# Patient Record
Sex: MALE | Employment: OTHER | URBAN - METROPOLITAN AREA
[De-identification: names, ages, dates, MRNs, and addresses within clinical notes are randomized per-mention and may not be internally consistent; named-entity substitution may affect disease eponyms.]

---

## 2019-03-04 ENCOUNTER — TRANSFERRED RECORDS (OUTPATIENT)
Dept: PHYSICAL THERAPY | Facility: CLINIC | Age: 64
End: 2019-03-04

## 2019-03-07 ENCOUNTER — THERAPY VISIT (OUTPATIENT)
Dept: PHYSICAL THERAPY | Facility: CLINIC | Age: 64
End: 2019-03-07
Payer: COMMERCIAL

## 2019-03-07 DIAGNOSIS — R20.0 NUMBNESS AND TINGLING OF BOTH FEET: ICD-10-CM

## 2019-03-07 DIAGNOSIS — M51.369 DDD (DEGENERATIVE DISC DISEASE), LUMBAR: ICD-10-CM

## 2019-03-07 DIAGNOSIS — R20.2 NUMBNESS AND TINGLING OF BOTH FEET: ICD-10-CM

## 2019-03-07 DIAGNOSIS — M54.50 MIDLINE LOW BACK PAIN WITHOUT SCIATICA: Primary | ICD-10-CM

## 2019-03-07 PROCEDURE — 97110 THERAPEUTIC EXERCISES: CPT | Mod: GP | Performed by: PHYSICAL THERAPIST

## 2019-03-07 PROCEDURE — 97161 PT EVAL LOW COMPLEX 20 MIN: CPT | Mod: GP | Performed by: PHYSICAL THERAPIST

## 2019-03-07 NOTE — LETTER
[facility]THLETIC [facility]  [address] [place] [address]  [address]  [facility] [id number] [phone]    2019    Re: Cameron Bennett   :   1955  MRN:  [id number]   REFERRING PHYSICIAN:   Bayron Silva    [facility] ATHLETIC [facility]    Date of Initial Evaluation:  3/7/19  Visits:  Rxs Used: 1  Reason for Referral:     Numbness and tingling of both feet  Midline low back pain without sciatica  DDD (degenerative disc disease), lumbar    EVALUATION SUMMARY    [facility] Initial [facility]    Subjective:    Patient is referred with c/o bilateral foot numbness and tingling and central LBP. He has been noting increasing sxs over the past 2 years and recently underwent and EMG which indicated some conduction issues related to lumbar spinal compression. Peripheral neuropathy related to diabetes has been ruled out. Sxs are worse with prolonged standing and walking. He is better when sitting or lying down. He has had PT in the past for LBP and responded well. PMH includes R TKA 4 years ago.   The history is provided by the patient.   Cameron Bennett is a 64 year old male with a lumbar condition.  Condition occurred with:  Insidious onset.  Condition occurred: for unknown reasons.  This is a chronic condition     Patient reports pain:  Central lumbar spine.  Radiates to:  Foot left and foot right.  Pain is described as other and is constant and reported as 4/10.  Associated symptoms:  Tingling and numbness. Pain is worse during the day.  Symptoms are exacerbated by standing and walking and relieved by rest (sitting, lying).  Since onset symptoms are gradually worsening.  Special tests:  EMG.  Previous treatment includes physical therapy.  There was moderate improvement following previous treatment.  General health as reported by patient is good.  Pertinent medical history includes:  High blood  pressure, implanted device, overweight and numbness/tingling.    Other surgeries include:  Orthopedic surgery (R TKA).        Primary job tasks include:  Driving.  Barriers include:  None as reported by the patient.  Red flags:  None as reported by the patient.            Re: Cameron Daileyderick   :   1955                        Objective:    [place] Lumbar Evaluation  Posture:  Sitting: good  Standing: good  Lordosis: WNL  Lateral Shift: no  Correction of Posture: no effect  Movement Loss:  Flexion (Flex): nil  Extension (EXT): mod and pain  Side Glide R (SG R): mod  Side Glide L (SG L): mod  Test Movements:  FIS: During: no effect  After: no effect  Mechanical Response: no effectPretest Movements: minor B foot numbness  Repeat FIS: During: no effect  After: no effect  Mechanical Response: no effect  EIS: During: produces  After: no worse  Mechanical Response: no effect  Repeat EIS: During: no effect  After: no effect  Mechanical Response: no effect  SOFIA: During: no effect  After: no effect  Mechanical Response: no effect  Repeat SOFIA: During: no effect  After: no effect  Mechanical Response: no effect  Conclusion: other (DDD, stenosis)  Principle of Treatment:  Flexion: SOFIA, FISitting x 10/ 2 hours  Other: core strengthening    Assessment/Plan:      Patient is a 64 year old male with lumbar complaints.    Patient has the following significant findings with corresponding treatment plan.                Diagnosis 1:  LBP, radiculopathy  Pain -  mechanical traction, self management, education, directional preference exercise and home program  Impaired gait - gait training and home program  Decreased function - therapeutic activities and home program                              Re: Cameron Daileyderick   :   1955            Therapy Evaluation Codes:   1) History comprised of:   Personal factors that impact the plan of care:      Time since onset of symptoms.    Comorbidity factors that impact the plan of  care are:      Overweight.     Medications impacting care: None.  2) Examination of Body Systems comprised of:   Body structures and functions that impact the plan of care:      Lumbar spine.   Activity limitations that impact the plan of care are:      Standing and Walking.  3) Clinical presentation characteristics are:   Stable/Uncomplicated.  4) Decision-Making    Low complexity using standardized patient assessment instrument and/or measureable assessment of functional outcome.  Cumulative Therapy Evaluation is: Low complexity.  Previous and current functional limitations:  (See Goal Flow Sheet for this information)    Short term and Long term goals: (See Goal Flow Sheet for this information)   Communication ability:  Patient appears to be able to clearly communicate and understand verbal and written communication and follow directions correctly.  Treatment Explanation - The following has been discussed with the patient:   RX ordered/plan of care  Anticipated outcomes  Possible risks and side effects  This patient would benefit from PT intervention to resume normal activities.   Rehab potential is good.  Frequency:  1 X week, once daily  Duration:  for 4 weeks  Discharge Plan:  Achieve all LTG.  Independent in home treatment program.  Reach maximal therapeutic benefit.    Thank you for your referral.    INQUIRIES  Therapist: Chris Foley, PT  [facility]  [address] [place] [address]  [address]  [facility] [address]  Phone: [phone]  Fax: [phone]

## 2019-03-07 NOTE — PROGRESS NOTES
[facility] Initial Evaluation  Subjective:  Patient is referred with c/o bilateral foot numbness and tingling and central LBP. He has been noting increasing sxs over the past 2 years and recently underwent and EMG which indicated some conduction issues related to lumbar spinal compression. Peripheral neuropathy related to diabetes has been ruled out. Sxs are worse with prolonged standing and walking. He is better when sitting or lying down. He has had PT in the past for LBP and responded well. PMH includes R TKA 4 years ago.       The history is provided by the patient.   Cameron Bennett is a 64 year old male with a lumbar condition.  Condition occurred with:  Insidious onset.  Condition occurred: for unknown reasons.  This is a chronic condition     Patient reports pain:  Central lumbar spine.  Radiates to:  Foot left and foot right.  Pain is described as other and is constant and reported as 4/10.  Associated symptoms:  Tingling and numbness. Pain is worse during the day.  Symptoms are exacerbated by standing and walking and relieved by rest (sitting, lying).  Since onset symptoms are gradually worsening.  Special tests:  EMG.  Previous treatment includes physical therapy.  There was moderate improvement following previous treatment.  General health as reported by patient is good.  Pertinent medical history includes:  High blood pressure, implanted device, overweight and numbness/tingling.    Other surgeries include:  Orthopedic surgery (R TKA).        Primary job tasks include:  Driving.    Barriers include:  None as reported by the patient.    Red flags:  None as reported by the patient.                        Objective:  System    Physical Exam      [place] Lumbar Evaluation    Posture:  Sitting: good  Standing: good  Lordosis: WNL  Lateral Shift: no  Correction of Posture: no effect    Movement Loss:  Flexion (Flex): nil  Extension (EXT): mod and pain  Side Glide R (SG R): mod  Side  Glide L (SG L): mod  Test Movements:  FIS: During: no effect  After: no effect  Mechanical Response: no effectPretest Movements: minor B foot numbness  Repeat FIS: During: no effect  After: no effect  Mechanical Response: no effect  EIS: During: produces  After: no worse  Mechanical Response: no effect  Repeat EIS: During: no effect  After: no effect  Mechanical Response: no effect  SOFIA: During: no effect  After: no effect  Mechanical Response: no effect  Repeat SOFIA: During: no effect  After: no effect  Mechanical Response: no effect          Conclusion: other (DDD, stenosis)  Principle of Treatment:    Flexion: SOFIA, FISitting x 10/ 2 hours      Other: core strengthening                                       ROS    Assessment/Plan:    Patient is a 64 year old male with lumbar complaints.    Patient has the following significant findings with corresponding treatment plan.                Diagnosis 1:  LBP, radiculopathy  Pain -  mechanical traction, self management, education, directional preference exercise and home program  Impaired gait - gait training and home program  Decreased function - therapeutic activities and home program    Therapy Evaluation Codes:   1) History comprised of:   Personal factors that impact the plan of care:      Time since onset of symptoms.    Comorbidity factors that impact the plan of care are:      Overweight.     Medications impacting care: None.  2) Examination of Body Systems comprised of:   Body structures and functions that impact the plan of care:      Lumbar spine.   Activity limitations that impact the plan of care are:      Standing and Walking.  3) Clinical presentation characteristics are:   Stable/Uncomplicated.  4) Decision-Making    Low complexity using standardized patient assessment instrument and/or measureable assessment of functional outcome.  Cumulative Therapy Evaluation is: Low complexity.    Previous and current functional limitations:  (See Goal Flow Sheet for  this information)    Short term and Long term goals: (See Goal Flow Sheet for this information)     Communication ability:  Patient appears to be able to clearly communicate and understand verbal and written communication and follow directions correctly.  Treatment Explanation - The following has been discussed with the patient:   RX ordered/plan of care  Anticipated outcomes  Possible risks and side effects  This patient would benefit from PT intervention to resume normal activities.   Rehab potential is good.    Frequency:  1 X week, once daily  Duration:  for 4 weeks  Discharge Plan:  Achieve all LTG.  Independent in home treatment program.  Reach maximal therapeutic benefit.    Please refer to the daily flowsheet for treatment today, total treatment time and time spent performing 1:1 timed codes.

## 2019-03-14 ENCOUNTER — THERAPY VISIT (OUTPATIENT)
Dept: PHYSICAL THERAPY | Facility: CLINIC | Age: 64
End: 2019-03-14
Payer: COMMERCIAL

## 2019-03-14 DIAGNOSIS — R20.0 NUMBNESS AND TINGLING OF BOTH FEET: ICD-10-CM

## 2019-03-14 DIAGNOSIS — M51.369 DDD (DEGENERATIVE DISC DISEASE), LUMBAR: ICD-10-CM

## 2019-03-14 DIAGNOSIS — R20.2 NUMBNESS AND TINGLING OF BOTH FEET: ICD-10-CM

## 2019-03-14 PROCEDURE — 97110 THERAPEUTIC EXERCISES: CPT | Mod: GP | Performed by: PHYSICAL THERAPIST

## 2019-03-21 ENCOUNTER — THERAPY VISIT (OUTPATIENT)
Dept: PHYSICAL THERAPY | Facility: CLINIC | Age: 64
End: 2019-03-21
Payer: COMMERCIAL

## 2019-03-21 DIAGNOSIS — R20.0 NUMBNESS AND TINGLING OF BOTH FEET: ICD-10-CM

## 2019-03-21 DIAGNOSIS — R20.2 NUMBNESS AND TINGLING OF BOTH FEET: ICD-10-CM

## 2019-03-21 DIAGNOSIS — M51.369 DDD (DEGENERATIVE DISC DISEASE), LUMBAR: ICD-10-CM

## 2019-03-21 PROCEDURE — 97110 THERAPEUTIC EXERCISES: CPT | Mod: GP | Performed by: PHYSICAL THERAPIST

## 2019-08-28 PROBLEM — R20.2 NUMBNESS AND TINGLING OF BOTH FEET: Status: RESOLVED | Noted: 2019-03-07 | Resolved: 2019-08-28

## 2019-08-28 PROBLEM — M51.369 DDD (DEGENERATIVE DISC DISEASE), LUMBAR: Status: RESOLVED | Noted: 2019-03-07 | Resolved: 2019-08-28

## 2019-08-28 PROBLEM — R20.0 NUMBNESS AND TINGLING OF BOTH FEET: Status: RESOLVED | Noted: 2019-03-07 | Resolved: 2019-08-28

## 2019-08-28 NOTE — PROGRESS NOTES
Discharge Note    Progress reporting period is from last progress note on   to Mar 21, 2019.    Cameron failed to follow up and current status is unknown.  Please see information below for last relevant information on current status.  Patient seen for 3 visits.    SUBJECTIVE  Subjective changes noted by patient:  Continues to note decrease in B foot numbness and overall pain.  .  Current pain level is  .     Previous pain level was  4/10.   Changes in function:  Yes (See Goal flowsheet attached for changes in current functional level)  Adverse reaction to treatment or activity: None    OBJECTIVE  Changes noted in objective findings: See flow. Discussed return to health club routine and what ex. to do and avoid.     ASSESSMENT/PLAN  Diagnosis: LBP, DDD   Updated problem list and treatment plan:   Pain - HEP  STG/LTGs have been met or progress has been made towards goals:  Yes, please see goal flowsheet for most current information  Assessment of Progress: current status is unknown.    Last current status: Pt is progressing well   Self Management Plans:  HEP  I have re-evaluated this patient and find that the nature, scope, duration and intensity of the therapy is appropriate for the medical condition of the patient.  Cameron continues to require the following intervention to meet STG and LTG's:  HEP.    Recommendations:  Discharge with current home program.  Patient to follow up with MD as needed.    Please refer to the daily flowsheet for treatment today, total treatment time and time spent performing 1:1 timed codes.

## 2019-11-07 ENCOUNTER — APPOINTMENT (OUTPATIENT)
Age: 64
Setting detail: DERMATOLOGY
End: 2019-11-07

## 2019-11-07 DIAGNOSIS — L57.8 OTHER SKIN CHANGES DUE TO CHRONIC EXPOSURE TO NONIONIZING RADIATION: ICD-10-CM

## 2019-11-07 DIAGNOSIS — D22 MELANOCYTIC NEVI: ICD-10-CM

## 2019-11-07 DIAGNOSIS — L82.1 OTHER SEBORRHEIC KERATOSIS: ICD-10-CM

## 2019-11-07 DIAGNOSIS — D18.0 HEMANGIOMA: ICD-10-CM

## 2019-11-07 PROBLEM — D22.5 MELANOCYTIC NEVI OF TRUNK: Status: ACTIVE | Noted: 2019-11-07

## 2019-11-07 PROBLEM — D18.01 HEMANGIOMA OF SKIN AND SUBCUTANEOUS TISSUE: Status: ACTIVE | Noted: 2019-11-07

## 2019-11-07 PROCEDURE — 99203 OFFICE O/P NEW LOW 30 MIN: CPT

## 2019-11-07 PROCEDURE — OTHER COUNSELING: OTHER

## 2019-11-07 ASSESSMENT — LOCATION DETAILED DESCRIPTION DERM
LOCATION DETAILED: SUPERIOR THORACIC SPINE
LOCATION DETAILED: LEFT INFERIOR MEDIAL UPPER BACK
LOCATION DETAILED: EPIGASTRIC SKIN
LOCATION DETAILED: RIGHT INFERIOR ANTERIOR NECK

## 2019-11-07 ASSESSMENT — LOCATION ZONE DERM
LOCATION ZONE: NECK
LOCATION ZONE: TRUNK

## 2019-11-07 ASSESSMENT — LOCATION SIMPLE DESCRIPTION DERM
LOCATION SIMPLE: RIGHT ANTERIOR NECK
LOCATION SIMPLE: LEFT UPPER BACK
LOCATION SIMPLE: UPPER BACK
LOCATION SIMPLE: ABDOMEN